# Patient Record
Sex: MALE | Race: WHITE | NOT HISPANIC OR LATINO | Employment: FULL TIME | URBAN - METROPOLITAN AREA
[De-identification: names, ages, dates, MRNs, and addresses within clinical notes are randomized per-mention and may not be internally consistent; named-entity substitution may affect disease eponyms.]

---

## 2022-04-06 ENCOUNTER — TELEPHONE (OUTPATIENT)
Dept: SCHEDULING | Facility: IMAGING CENTER | Age: 47
End: 2022-04-06

## 2022-04-17 SDOH — ECONOMIC STABILITY: INCOME INSECURITY: IN THE LAST 12 MONTHS, WAS THERE A TIME WHEN YOU WERE NOT ABLE TO PAY THE MORTGAGE OR RENT ON TIME?: NO

## 2022-04-17 SDOH — ECONOMIC STABILITY: FOOD INSECURITY: WITHIN THE PAST 12 MONTHS, YOU WORRIED THAT YOUR FOOD WOULD RUN OUT BEFORE YOU GOT MONEY TO BUY MORE.: SOMETIMES TRUE

## 2022-04-17 SDOH — ECONOMIC STABILITY: TRANSPORTATION INSECURITY
IN THE PAST 12 MONTHS, HAS LACK OF RELIABLE TRANSPORTATION KEPT YOU FROM MEDICAL APPOINTMENTS, MEETINGS, WORK OR FROM GETTING THINGS NEEDED FOR DAILY LIVING?: NO

## 2022-04-17 SDOH — ECONOMIC STABILITY: HOUSING INSECURITY: IN THE LAST 12 MONTHS, HOW MANY PLACES HAVE YOU LIVED?: 3

## 2022-04-17 SDOH — HEALTH STABILITY: PHYSICAL HEALTH: ON AVERAGE, HOW MANY MINUTES DO YOU ENGAGE IN EXERCISE AT THIS LEVEL?: 100 MIN

## 2022-04-17 SDOH — ECONOMIC STABILITY: TRANSPORTATION INSECURITY
IN THE PAST 12 MONTHS, HAS THE LACK OF TRANSPORTATION KEPT YOU FROM MEDICAL APPOINTMENTS OR FROM GETTING MEDICATIONS?: NO

## 2022-04-17 SDOH — ECONOMIC STABILITY: HOUSING INSECURITY
IN THE LAST 12 MONTHS, WAS THERE A TIME WHEN YOU DID NOT HAVE A STEADY PLACE TO SLEEP OR SLEPT IN A SHELTER (INCLUDING NOW)?: NO

## 2022-04-17 SDOH — HEALTH STABILITY: PHYSICAL HEALTH: ON AVERAGE, HOW MANY DAYS PER WEEK DO YOU ENGAGE IN MODERATE TO STRENUOUS EXERCISE (LIKE A BRISK WALK)?: 6 DAYS

## 2022-04-17 SDOH — ECONOMIC STABILITY: FOOD INSECURITY: WITHIN THE PAST 12 MONTHS, THE FOOD YOU BOUGHT JUST DIDN'T LAST AND YOU DIDN'T HAVE MONEY TO GET MORE.: SOMETIMES TRUE

## 2022-04-17 SDOH — ECONOMIC STABILITY: TRANSPORTATION INSECURITY
IN THE PAST 12 MONTHS, HAS LACK OF TRANSPORTATION KEPT YOU FROM MEETINGS, WORK, OR FROM GETTING THINGS NEEDED FOR DAILY LIVING?: NO

## 2022-04-17 SDOH — ECONOMIC STABILITY: INCOME INSECURITY: HOW HARD IS IT FOR YOU TO PAY FOR THE VERY BASICS LIKE FOOD, HOUSING, MEDICAL CARE, AND HEATING?: VERY HARD

## 2022-04-17 SDOH — HEALTH STABILITY: MENTAL HEALTH

## 2022-04-17 ASSESSMENT — SOCIAL DETERMINANTS OF HEALTH (SDOH)
HOW OFTEN DO YOU ATTENT MEETINGS OF THE CLUB OR ORGANIZATION YOU BELONG TO?: PATIENT DECLINED
HOW OFTEN DO YOU ATTEND CHURCH OR RELIGIOUS SERVICES?: PATIENT DECLINED
WITHIN THE PAST 12 MONTHS, YOU WORRIED THAT YOUR FOOD WOULD RUN OUT BEFORE YOU GOT THE MONEY TO BUY MORE: SOMETIMES TRUE
HOW MANY DRINKS CONTAINING ALCOHOL DO YOU HAVE ON A TYPICAL DAY WHEN YOU ARE DRINKING: 1 OR 2
HOW HARD IS IT FOR YOU TO PAY FOR THE VERY BASICS LIKE FOOD, HOUSING, MEDICAL CARE, AND HEATING?: VERY HARD
HOW OFTEN DO YOU HAVE SIX OR MORE DRINKS ON ONE OCCASION: NEVER
DO YOU BELONG TO ANY CLUBS OR ORGANIZATIONS SUCH AS CHURCH GROUPS UNIONS, FRATERNAL OR ATHLETIC GROUPS, OR SCHOOL GROUPS?: YES
HOW OFTEN DO YOU GET TOGETHER WITH FRIENDS OR RELATIVES?: TWICE A WEEK
IN A TYPICAL WEEK, HOW MANY TIMES DO YOU TALK ON THE PHONE WITH FAMILY, FRIENDS, OR NEIGHBORS?: TWICE A WEEK
HOW OFTEN DO YOU GET TOGETHER WITH FRIENDS OR RELATIVES?: TWICE A WEEK
HOW OFTEN DO YOU ATTENT MEETINGS OF THE CLUB OR ORGANIZATION YOU BELONG TO?: PATIENT DECLINED
HOW OFTEN DO YOU ATTEND CHURCH OR RELIGIOUS SERVICES?: PATIENT DECLINED
HOW OFTEN DO YOU HAVE A DRINK CONTAINING ALCOHOL: MONTHLY OR LESS
IN A TYPICAL WEEK, HOW MANY TIMES DO YOU TALK ON THE PHONE WITH FAMILY, FRIENDS, OR NEIGHBORS?: TWICE A WEEK
DO YOU BELONG TO ANY CLUBS OR ORGANIZATIONS SUCH AS CHURCH GROUPS UNIONS, FRATERNAL OR ATHLETIC GROUPS, OR SCHOOL GROUPS?: YES

## 2022-04-17 ASSESSMENT — LIFESTYLE VARIABLES
HOW MANY STANDARD DRINKS CONTAINING ALCOHOL DO YOU HAVE ON A TYPICAL DAY: 1 OR 2
HOW OFTEN DO YOU HAVE A DRINK CONTAINING ALCOHOL: MONTHLY OR LESS
HOW OFTEN DO YOU HAVE SIX OR MORE DRINKS ON ONE OCCASION: NEVER

## 2022-04-20 ENCOUNTER — OFFICE VISIT (OUTPATIENT)
Dept: MEDICAL GROUP | Facility: MEDICAL CENTER | Age: 47
End: 2022-04-20
Payer: MEDICAID

## 2022-04-20 VITALS
DIASTOLIC BLOOD PRESSURE: 84 MMHG | BODY MASS INDEX: 24.6 KG/M2 | WEIGHT: 202 LBS | HEIGHT: 76 IN | TEMPERATURE: 98.1 F | OXYGEN SATURATION: 97 % | SYSTOLIC BLOOD PRESSURE: 136 MMHG | HEART RATE: 89 BPM

## 2022-04-20 DIAGNOSIS — M79.632 LEFT FOREARM PAIN: ICD-10-CM

## 2022-04-20 DIAGNOSIS — Z80.0 FAMILY HISTORY OF COLORECTAL CANCER: ICD-10-CM

## 2022-04-20 DIAGNOSIS — G89.29 CHRONIC RIGHT SHOULDER PAIN: ICD-10-CM

## 2022-04-20 DIAGNOSIS — F17.220 CHEWING TOBACCO NICOTINE DEPENDENCE WITHOUT COMPLICATION: ICD-10-CM

## 2022-04-20 DIAGNOSIS — Z80.42 FAMILY HX OF PROSTATE CANCER: ICD-10-CM

## 2022-04-20 DIAGNOSIS — E78.2 MIXED HYPERLIPIDEMIA: ICD-10-CM

## 2022-04-20 DIAGNOSIS — D23.39 DERMOID CYST OF FOREHEAD: ICD-10-CM

## 2022-04-20 DIAGNOSIS — Z12.5 PROSTATE CANCER SCREENING: ICD-10-CM

## 2022-04-20 DIAGNOSIS — M25.511 CHRONIC RIGHT SHOULDER PAIN: ICD-10-CM

## 2022-04-20 DIAGNOSIS — R79.89 LOW TESTOSTERONE: ICD-10-CM

## 2022-04-20 DIAGNOSIS — Z13.79 GENETIC SCREENING: ICD-10-CM

## 2022-04-20 DIAGNOSIS — Z13.220 LIPID SCREENING: ICD-10-CM

## 2022-04-20 DIAGNOSIS — M67.431 GANGLION CYST OF VOLAR ASPECT OF RIGHT WRIST: ICD-10-CM

## 2022-04-20 PROBLEM — G47.30 SLEEP APNEA: Status: ACTIVE | Noted: 2021-06-22

## 2022-04-20 PROCEDURE — 99204 OFFICE O/P NEW MOD 45 MIN: CPT | Performed by: FAMILY MEDICINE

## 2022-04-20 RX ORDER — ALBUTEROL SULFATE 90 UG/1
2 AEROSOL, METERED RESPIRATORY (INHALATION)
COMMUNITY
Start: 2021-05-04 | End: 2022-05-04

## 2022-04-20 RX ORDER — SILDENAFIL 50 MG/1
TABLET, FILM COATED ORAL
COMMUNITY
Start: 2022-04-05

## 2022-04-20 RX ORDER — ALBUTEROL SULFATE 2.5 MG/3ML
2.5 SOLUTION RESPIRATORY (INHALATION)
COMMUNITY

## 2022-04-20 ASSESSMENT — PATIENT HEALTH QUESTIONNAIRE - PHQ9: CLINICAL INTERPRETATION OF PHQ2 SCORE: 0

## 2022-04-20 NOTE — ASSESSMENT & PLAN NOTE
We will forego imaging as I do not think an x-ray will be helpful I am not quite sure what hand specialist want but I am going to refer him to a hand specialist as we discussed there is a lot of traffic in that area and removal should be done by someone that is comfortable working in the hand and wrist.

## 2022-04-20 NOTE — ASSESSMENT & PLAN NOTE
Patient does have some swelling on the posterior left forearm without discoloration and some decreased range of motion at the wrist  We will get x-ray  Encouraged patient to alternate ice and heat

## 2022-04-20 NOTE — ASSESSMENT & PLAN NOTE
Given that his sounds like he has had x-rays and MRIs of the shoulder those records need to be requested I will not repeat imaging unless there is a need to  He has decreased range of motion going overhead he is concerned that it is unstable and may pop out when he does  Referral to orthopedic surgery

## 2022-04-20 NOTE — PROGRESS NOTES
Subjective:     CC:  Diagnoses of Low testosterone, Chewing tobacco nicotine dependence without complication, Ganglion cyst of volar aspect of right wrist, Dermoid cyst of forehead, Chronic right shoulder pain, Left forearm pain, Lipid screening, Prostate cancer screening, Family history of colorectal cancer, Family hx of prostate cancer, Genetic screening, and Mixed hyperlipidemia were pertinent to this visit.    HISTORY OF THE PRESENT ILLNESS: Patient is a 46 y.o. male. This pleasant patient is here today to establish care and discuss testosterone replacement, cysts, right shoulder, left forearm.     Problem   Chewing Tobacco Nicotine Dependence Without Complication    Patient does not smoke his father was a heavy smoker and  of lung cancer.  He does do chewing tobacco approximately 5 can every 2 days.  He is working on cutting back and he is interested in quitting.     Ganglion Cyst of Volar Aspect of Right Wrist    Patient has a new cyst that has popped up on his right anterior wrist.  Does cause him some discomfort at times but is not too bad.  He said it came on quickly.  He has had benign cyst removed from his legs before but he does not recall ever being told it is a lipoma.     Dermoid Cyst of Forehead    Patient with cyst on his forehead.  It is nonpainful.  He came on quickly.  He has a history of growing benign cyst but does not recall ever having been told it is a lipoma.     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  History of clavicle fracture on that side  Has had multiple dislocations  Last imaging (MRI) in December  They were discussing surgery with him but he has moved from [place] to [place] and so needs to establish with a new orthopedic surgeon     Left Forearm Pain    3 weeks ago hit door on accident with left forearm, has had tenderness, swelling and decreased wrist motion with dorsiflexion against or  "extension of the wrist  He has not had any bruising     Family History of Colorectal Cancer    Patient's had multiple paternal uncles on his father side with colorectal cancer  He told me he has recently had a colonoscopy and was given a 10-year clearance  Records request pending     Family Hx of Prostate Cancer    He is also had several uncles with prostate cancer from his father side  This does make him higher risk with the testosterone replacement     Genetic Screening    Patient has significant family history for colorectal cancer     Low Testosterone    Last injection was 7 weeks ago, patient had been receiving 100 mg IM every 3 weeks.  His urologist at the time was concerned that that was too low of a dose.  He has not had labs in a year.    Patient has a strong family history of prostate cancer.  His cholesterol panel including his LDL has been elevated in the past.    Patient denies decreased libido, feeling tired or fatigued currently     Mixed Hyperlipidemia    Patient has had elevated lipid panel in the past  No significant family history of heart disease  Can be driven by the testosterone replacement       Sleep Apnea       Current Outpatient Medications Ordered in Epic   Medication Sig Dispense Refill   • sildenafil citrate (VIAGRA) 50 MG tablet TAKE ONE TABLET BY MOUTH DAILY IF NEEDED FOR ERECTILE DYSFUNCTION     • albuterol 108 (90 Base) MCG/ACT Aero Soln inhalation aerosol Inhale 2 Puffs.     • albuterol (PROVENTIL) 2.5mg/3ml Nebu Soln solution for nebulization Inhale 2.5 mg.       No current [facility]-ordered facility-administered medications on file.       Health Maintenance: Counseled on getting COVID shot    ROS:   ROS see HPI      Objective:       Exam: /84   Pulse 89   Temp 36.7 °C (98.1 °F) (Temporal)   Ht 1.93 m (6' 4\")   Wt 91.6 kg (202 lb)   SpO2 97%  Body mass index is 24.59 kg/m².    Physical Exam  Vitals and nursing note reviewed.   Constitutional:       General: He is not in acute " distress.     Appearance: Normal appearance. He is normal weight.   HENT:      Head: Normocephalic and atraumatic.      Comments: Small cystic structure in the middle of the forehead  Birthmark surrounding left eye  Cardiovascular:      Rate and Rhythm: Normal rate and regular rhythm.      Heart sounds: Normal heart sounds.   Pulmonary:      Effort: Pulmonary effort is normal.      Breath sounds: Normal breath sounds.   Musculoskeletal:      Comments: Decreased range of motion with right shoulder  No discoloration but some mild swelling on the posterior surface of the left forearm  Decreased wrist motion on left   Skin:     General: Skin is warm and dry.   Neurological:      Mental Status: He is alert. Mental status is at baseline.   Psychiatric:         Mood and Affect: Mood normal.         Behavior: Behavior normal.           Assessment & Plan:   46 y.o. male with the following -    Problem List Items Addressed This Visit     Low testosterone     We will check testosterone level, PSA, lipids, LFTs, CBC  Referral to urology  Discussed with patient that if it takes time to get into urology and he starts feeling bad again can get him a prescription for testosterone injection to bridge time until he gets into see them         Relevant Orders    PROSTATE SPECIFIC AG SCREENING    Comp Metabolic Panel    Lipid Profile    Testosterone, Free & Total, Adult Male (w/SHBG)    Referral to Urology    Mixed hyperlipidemia     We will get an updated fasting lipid panel  Discussed with patient we may need to start medication to help control this         Relevant Medications    sildenafil citrate (VIAGRA) 50 MG tablet    Other Relevant Orders    Comp Metabolic Panel    Lipid Profile    Chewing tobacco nicotine dependence without complication     Will refer to tobacco cessation program so they can discuss different methods for quitting         Relevant Orders    Referral to Tobacco Cessation Program    CBC WITH DIFFERENTIAL     Ganglion cyst of volar aspect of right wrist     We will forego imaging as I do not think an x-ray will be helpful I am not quite sure what hand specialist want but I am going to refer him to a hand specialist as we discussed there is a lot of traffic in that area and removal should be done by someone that is comfortable working in the hand and wrist.         Relevant Orders    Referral to Hand Surgery    Dermoid cyst of forehead     Small, smooth, fluid-filled cyst appears to be on forehead.  Referral to dermatology           Relevant Orders    Referral to Dermatology    Chronic right shoulder pain     Given that his sounds like he has had x-rays and MRIs of the shoulder those records need to be requested I will not repeat imaging unless there is a need to  He has decreased range of motion going overhead he is concerned that it is unstable and may pop out when he does  Referral to orthopedic surgery         Relevant Orders    Referral to Orthopedics    Left forearm pain     Patient does have some swelling on the posterior left forearm without discoloration and some decreased range of motion at the wrist  We will get x-ray  Encouraged patient to alternate ice and heat         Relevant Orders    DX-FOREARM LEFT    Family history of colorectal cancer     Referral to [facility]  We will update with records are in place         Relevant Orders    [facility]    Family hx of prostate cancer     We will continue to monitor PSAs         Relevant Orders    PROSTATE SPECIFIC AG SCREENING    [facility]    Referral to Urology    Genetic screening     Referral to [facility]         Relevant Orders    [facility]      Other Visit Diagnoses     Lipid screening        Relevant Orders    Comp Metabolic Panel    Lipid Profile    Prostate cancer screening        Relevant Orders    PROSTATE SPECIFIC AG SCREENING            Return in about 1 year (around 4/20/2023), or if symptoms worsen or  fail to improve, for Annual Visit.    Please note that this dictation was created using voice recognition software. I have made every reasonable attempt to correct obvious errors, but I expect that there are errors of grammar and possibly content that I did not discover before finalizing the note.

## 2022-04-20 NOTE — ASSESSMENT & PLAN NOTE
We will check testosterone level, PSA, lipids, LFTs, CBC  Referral to urology  Discussed with patient that if it takes time to get into urology and he starts feeling bad again can get him a prescription for testosterone injection to bridge time until he gets into see them

## 2022-04-20 NOTE — ASSESSMENT & PLAN NOTE
We will get an updated fasting lipid panel  Discussed with patient we may need to start medication to help control this

## 2022-05-04 ENCOUNTER — PATIENT MESSAGE (OUTPATIENT)
Dept: HEALTH INFORMATION MANAGEMENT | Facility: OTHER | Age: 47
End: 2022-05-04

## 2022-05-13 DIAGNOSIS — D23.39 DERMOID CYST OF FOREHEAD: ICD-10-CM

## 2022-05-18 ENCOUNTER — HOSPITAL ENCOUNTER (OUTPATIENT)
Dept: LAB | Facility: MEDICAL CENTER | Age: 47
End: 2022-05-18
Attending: FAMILY MEDICINE
Payer: MEDICAID

## 2022-05-18 DIAGNOSIS — F17.220 CHEWING TOBACCO NICOTINE DEPENDENCE WITHOUT COMPLICATION: ICD-10-CM

## 2022-05-18 DIAGNOSIS — Z80.42 FAMILY HX OF PROSTATE CANCER: ICD-10-CM

## 2022-05-18 DIAGNOSIS — Z12.5 PROSTATE CANCER SCREENING: ICD-10-CM

## 2022-05-18 DIAGNOSIS — R79.89 LOW TESTOSTERONE: ICD-10-CM

## 2022-05-18 DIAGNOSIS — Z13.220 LIPID SCREENING: ICD-10-CM

## 2022-05-18 DIAGNOSIS — E78.2 MIXED HYPERLIPIDEMIA: ICD-10-CM

## 2022-05-18 LAB
ALBUMIN SERPL BCP-MCNC: 3.8 G/DL (ref 3.2–4.9)
ALBUMIN/GLOB SERPL: 1.2 G/DL
ALP SERPL-CCNC: 59 U/L (ref 30–99)
ALT SERPL-CCNC: 17 U/L (ref 2–50)
ANION GAP SERPL CALC-SCNC: 9 MMOL/L (ref 7–16)
AST SERPL-CCNC: 26 U/L (ref 12–45)
BASOPHILS # BLD AUTO: 0.8 % (ref 0–1.8)
BASOPHILS # BLD: 0.04 K/UL (ref 0–0.12)
BILIRUB SERPL-MCNC: 0.3 MG/DL (ref 0.1–1.5)
BUN SERPL-MCNC: 12 MG/DL (ref 8–22)
CALCIUM SERPL-MCNC: 9 MG/DL (ref 8.5–10.5)
CHLORIDE SERPL-SCNC: 104 MMOL/L (ref 96–112)
CHOLEST SERPL-MCNC: 181 MG/DL (ref 100–199)
CO2 SERPL-SCNC: 26 MMOL/L (ref 20–33)
CREAT SERPL-MCNC: 1.14 MG/DL (ref 0.5–1.4)
EOSINOPHIL # BLD AUTO: 0.14 K/UL (ref 0–0.51)
EOSINOPHIL NFR BLD: 2.8 % (ref 0–6.9)
ERYTHROCYTE [DISTWIDTH] IN BLOOD BY AUTOMATED COUNT: 47.6 FL (ref 35.9–50)
GFR SERPLBLD CREATININE-BSD FMLA CKD-EPI: 80 ML/MIN/1.73 M 2
GLOBULIN SER CALC-MCNC: 3.2 G/DL (ref 1.9–3.5)
GLUCOSE SERPL-MCNC: 70 MG/DL (ref 65–99)
HCT VFR BLD AUTO: 49.6 % (ref 42–52)
HDLC SERPL-MCNC: 27 MG/DL
HGB BLD-MCNC: 15.8 G/DL (ref 14–18)
IMM GRANULOCYTES # BLD AUTO: 0.02 K/UL (ref 0–0.11)
IMM GRANULOCYTES NFR BLD AUTO: 0.4 % (ref 0–0.9)
LDLC SERPL CALC-MCNC: 97 MG/DL
LYMPHOCYTES # BLD AUTO: 1.5 K/UL (ref 1–4.8)
LYMPHOCYTES NFR BLD: 30 % (ref 22–41)
MCH RBC QN AUTO: 28 PG (ref 27–33)
MCHC RBC AUTO-ENTMCNC: 31.9 G/DL (ref 33.7–35.3)
MCV RBC AUTO: 87.8 FL (ref 81.4–97.8)
MONOCYTES # BLD AUTO: 0.44 K/UL (ref 0–0.85)
MONOCYTES NFR BLD AUTO: 8.8 % (ref 0–13.4)
NEUTROPHILS # BLD AUTO: 2.86 K/UL (ref 1.82–7.42)
NEUTROPHILS NFR BLD: 57.2 % (ref 44–72)
NRBC # BLD AUTO: 0 K/UL
NRBC BLD-RTO: 0 /100 WBC
PLATELET # BLD AUTO: 312 K/UL (ref 164–446)
PMV BLD AUTO: 10.4 FL (ref 9–12.9)
POTASSIUM SERPL-SCNC: 5.1 MMOL/L (ref 3.6–5.5)
PROT SERPL-MCNC: 7 G/DL (ref 6–8.2)
RBC # BLD AUTO: 5.65 M/UL (ref 4.7–6.1)
SODIUM SERPL-SCNC: 139 MMOL/L (ref 135–145)
TRIGL SERPL-MCNC: 284 MG/DL (ref 0–149)
WBC # BLD AUTO: 5 K/UL (ref 4.8–10.8)

## 2022-05-18 PROCEDURE — 84402 ASSAY OF FREE TESTOSTERONE: CPT

## 2022-05-18 PROCEDURE — 84403 ASSAY OF TOTAL TESTOSTERONE: CPT

## 2022-05-18 PROCEDURE — 36415 COLL VENOUS BLD VENIPUNCTURE: CPT

## 2022-05-18 PROCEDURE — 84270 ASSAY OF SEX HORMONE GLOBUL: CPT

## 2022-05-18 PROCEDURE — 80061 LIPID PANEL: CPT

## 2022-05-18 PROCEDURE — 80053 COMPREHEN METABOLIC PANEL: CPT

## 2022-05-18 PROCEDURE — 85025 COMPLETE CBC W/AUTO DIFF WBC: CPT

## 2022-05-18 PROCEDURE — 84153 ASSAY OF PSA TOTAL: CPT

## 2022-05-19 LAB — PSA SERPL-MCNC: 2.22 NG/ML (ref 0–4)

## 2022-05-20 LAB
SHBG SERPL-SCNC: 20 NMOL/L (ref 17–56)
TESTOST FREE MFR SERPL: ABNORMAL % (ref 1.6–2.9)
TESTOST FREE SERPL-MCNC: ABNORMAL PG/ML (ref 47–244)
TESTOST SERPL-MCNC: >1500 NG/DL (ref 300–890)